# Patient Record
Sex: FEMALE | Race: WHITE | NOT HISPANIC OR LATINO | Employment: PART TIME | URBAN - METROPOLITAN AREA
[De-identification: names, ages, dates, MRNs, and addresses within clinical notes are randomized per-mention and may not be internally consistent; named-entity substitution may affect disease eponyms.]

---

## 2019-03-12 ENCOUNTER — OFFICE VISIT (OUTPATIENT)
Dept: FAMILY MEDICINE | Facility: CLINIC | Age: 34
End: 2019-03-12
Payer: MEDICAID

## 2019-03-12 VITALS
HEIGHT: 63 IN | HEART RATE: 76 BPM | SYSTOLIC BLOOD PRESSURE: 120 MMHG | RESPIRATION RATE: 16 BRPM | BODY MASS INDEX: 39.48 KG/M2 | DIASTOLIC BLOOD PRESSURE: 72 MMHG | TEMPERATURE: 98 F | OXYGEN SATURATION: 98 % | WEIGHT: 222.81 LBS

## 2019-03-12 DIAGNOSIS — R20.2 NUMBNESS AND TINGLING OF RIGHT ARM: ICD-10-CM

## 2019-03-12 DIAGNOSIS — R20.0 NUMBNESS AND TINGLING OF RIGHT ARM: ICD-10-CM

## 2019-03-12 DIAGNOSIS — L53.9 ERYTHEMA: ICD-10-CM

## 2019-03-12 DIAGNOSIS — M25.511 ACUTE PAIN OF RIGHT SHOULDER: ICD-10-CM

## 2019-03-12 DIAGNOSIS — S49.91XA RIGHT SHOULDER INJURY, INITIAL ENCOUNTER: ICD-10-CM

## 2019-03-12 DIAGNOSIS — M25.311 INSTABILITY OF SHOULDER JOINT, RIGHT: Primary | ICD-10-CM

## 2019-03-12 PROCEDURE — 99203 PR OFFICE/OUTPT VISIT, NEW, LEVL III, 30-44 MIN: ICD-10-PCS | Mod: S$GLB,,, | Performed by: NURSE PRACTITIONER

## 2019-03-12 PROCEDURE — 99203 OFFICE O/P NEW LOW 30 MIN: CPT | Mod: S$GLB,,, | Performed by: NURSE PRACTITIONER

## 2019-03-12 RX ORDER — DICLOFENAC SODIUM 50 MG/1
1 TABLET, DELAYED RELEASE ORAL 3 TIMES DAILY PRN
COMMUNITY
Start: 2019-03-09 | End: 2019-06-04

## 2019-03-12 RX ORDER — TRAMADOL HYDROCHLORIDE 50 MG/1
50 TABLET ORAL EVERY 8 HOURS PRN
Qty: 28 TABLET | Refills: 0 | Status: SHIPPED | OUTPATIENT
Start: 2019-03-12 | End: 2019-05-17 | Stop reason: SDUPTHER
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
## 2019-05-17 ENCOUNTER — OFFICE VISIT (OUTPATIENT)
Dept: FAMILY MEDICINE | Facility: CLINIC | Age: 34
End: 2019-05-17
Payer: MEDICAID

## 2019-05-17 VITALS
OXYGEN SATURATION: 98 % | HEART RATE: 66 BPM | RESPIRATION RATE: 16 BRPM | SYSTOLIC BLOOD PRESSURE: 128 MMHG | WEIGHT: 228 LBS | DIASTOLIC BLOOD PRESSURE: 70 MMHG | BODY MASS INDEX: 40.4 KG/M2 | HEIGHT: 63 IN

## 2019-05-17 DIAGNOSIS — R20.0 NUMBNESS AND TINGLING OF RIGHT ARM: ICD-10-CM

## 2019-05-17 DIAGNOSIS — M25.511 ACUTE PAIN OF RIGHT SHOULDER: ICD-10-CM

## 2019-05-17 DIAGNOSIS — M25.311 INSTABILITY OF SHOULDER JOINT, RIGHT: Primary | ICD-10-CM

## 2019-05-17 DIAGNOSIS — S49.91XD RIGHT SHOULDER INJURY, SUBSEQUENT ENCOUNTER: ICD-10-CM

## 2019-05-17 DIAGNOSIS — R20.2 NUMBNESS AND TINGLING OF RIGHT ARM: ICD-10-CM

## 2019-05-17 PROCEDURE — 99214 OFFICE O/P EST MOD 30 MIN: CPT | Mod: S$GLB,,, | Performed by: NURSE PRACTITIONER

## 2019-05-17 PROCEDURE — 99214 PR OFFICE/OUTPT VISIT, EST, LEVL IV, 30-39 MIN: ICD-10-PCS | Mod: S$GLB,,, | Performed by: NURSE PRACTITIONER

## 2019-05-17 RX ORDER — NAPROXEN SODIUM 550 MG/1
550 TABLET ORAL 2 TIMES DAILY WITH MEALS
Qty: 60 TABLET | Refills: 0 | Status: SHIPPED | OUTPATIENT
Start: 2019-05-17 | End: 2019-06-18 | Stop reason: SDUPTHER

## 2019-05-17 RX ORDER — TRAMADOL HYDROCHLORIDE 50 MG/1
50 TABLET ORAL EVERY 6 HOURS PRN
Qty: 28 TABLET | Refills: 1 | Status: SHIPPED | OUTPATIENT
Start: 2019-05-17 | End: 2019-06-04 | Stop reason: SDUPTHER
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
## 2019-05-20 ENCOUNTER — PATIENT MESSAGE (OUTPATIENT)
Dept: FAMILY MEDICINE | Facility: CLINIC | Age: 34
End: 2019-05-20

## 2019-05-23 DIAGNOSIS — M25.511 CHRONIC RIGHT SHOULDER PAIN: Primary | ICD-10-CM

## 2019-05-23 DIAGNOSIS — S46.001D ROTATOR CUFF INJURY, RIGHT, SUBSEQUENT ENCOUNTER: ICD-10-CM

## 2019-05-23 DIAGNOSIS — G89.29 CHRONIC RIGHT SHOULDER PAIN: Primary | ICD-10-CM

## 2019-06-04 ENCOUNTER — OFFICE VISIT (OUTPATIENT)
Dept: FAMILY MEDICINE | Facility: CLINIC | Age: 34
End: 2019-06-04
Payer: MEDICAID

## 2019-06-04 VITALS
BODY MASS INDEX: 40.57 KG/M2 | TEMPERATURE: 97 F | HEART RATE: 80 BPM | OXYGEN SATURATION: 98 % | RESPIRATION RATE: 16 BRPM | WEIGHT: 229 LBS | SYSTOLIC BLOOD PRESSURE: 110 MMHG | HEIGHT: 63 IN | DIASTOLIC BLOOD PRESSURE: 70 MMHG

## 2019-06-04 DIAGNOSIS — R21 RASH/SKIN ERUPTION: Primary | ICD-10-CM

## 2019-06-04 DIAGNOSIS — M75.111 INCOMPLETE TEAR OF RIGHT ROTATOR CUFF: ICD-10-CM

## 2019-06-04 DIAGNOSIS — R20.0 NUMBNESS AND TINGLING OF RIGHT ARM: ICD-10-CM

## 2019-06-04 DIAGNOSIS — R20.2 NUMBNESS AND TINGLING OF RIGHT ARM: ICD-10-CM

## 2019-06-04 DIAGNOSIS — M25.511 ACUTE PAIN OF RIGHT SHOULDER: ICD-10-CM

## 2019-06-04 DIAGNOSIS — M25.311 INSTABILITY OF SHOULDER JOINT, RIGHT: ICD-10-CM

## 2019-06-04 PROCEDURE — 99213 OFFICE O/P EST LOW 20 MIN: CPT | Mod: S$GLB,,, | Performed by: NURSE PRACTITIONER

## 2019-06-04 PROCEDURE — 99213 PR OFFICE/OUTPT VISIT, EST, LEVL III, 20-29 MIN: ICD-10-PCS | Mod: S$GLB,,, | Performed by: NURSE PRACTITIONER

## 2019-06-04 RX ORDER — TRAMADOL HYDROCHLORIDE 50 MG/1
100 TABLET ORAL EVERY 6 HOURS PRN
Qty: 56 TABLET | Refills: 0 | Status: SHIPPED | OUTPATIENT
Start: 2019-06-04 | End: 2019-09-11 | Stop reason: SDUPTHER

## 2019-06-04 RX ORDER — BETAMETHASONE VALERATE 1.2 MG/G
OINTMENT TOPICAL 2 TIMES DAILY
Qty: 15 G | Refills: 2 | Status: SHIPPED | OUTPATIENT
Start: 2019-06-04
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
## 2019-06-18 DIAGNOSIS — M25.511 ACUTE PAIN OF RIGHT SHOULDER: ICD-10-CM

## 2019-06-18 RX ORDER — NAPROXEN SODIUM 550 MG/1
TABLET ORAL
Qty: 60 TABLET | Refills: 0 | Status: SHIPPED | OUTPATIENT
Start: 2019-06-18 | End: 2019-08-14 | Stop reason: SDUPTHER

## 2019-08-14 DIAGNOSIS — M25.511 ACUTE PAIN OF RIGHT SHOULDER: ICD-10-CM

## 2019-08-15 RX ORDER — NAPROXEN SODIUM 550 MG/1
TABLET ORAL
Qty: 60 TABLET | Refills: 0 | Status: SHIPPED | OUTPATIENT
Start: 2019-08-15 | End: 2019-09-11 | Stop reason: SDUPTHER

## 2019-09-11 ENCOUNTER — OFFICE VISIT (OUTPATIENT)
Dept: FAMILY MEDICINE | Facility: CLINIC | Age: 34
End: 2019-09-11
Payer: MEDICAID

## 2019-09-11 VITALS
HEART RATE: 62 BPM | OXYGEN SATURATION: 99 % | TEMPERATURE: 97 F | SYSTOLIC BLOOD PRESSURE: 120 MMHG | RESPIRATION RATE: 16 BRPM | BODY MASS INDEX: 40.43 KG/M2 | WEIGHT: 228.19 LBS | HEIGHT: 63 IN | DIASTOLIC BLOOD PRESSURE: 80 MMHG

## 2019-09-11 DIAGNOSIS — M25.511 ACUTE PAIN OF RIGHT SHOULDER: ICD-10-CM

## 2019-09-11 DIAGNOSIS — R20.0 NUMBNESS AND TINGLING OF RIGHT ARM: ICD-10-CM

## 2019-09-11 DIAGNOSIS — M54.2 NECK PAIN: ICD-10-CM

## 2019-09-11 DIAGNOSIS — M79.601 PAIN IN RIGHT ARM: Primary | ICD-10-CM

## 2019-09-11 DIAGNOSIS — M75.111 INCOMPLETE TEAR OF RIGHT ROTATOR CUFF: ICD-10-CM

## 2019-09-11 DIAGNOSIS — R20.2 NUMBNESS AND TINGLING OF RIGHT ARM: ICD-10-CM

## 2019-09-11 PROCEDURE — 99214 OFFICE O/P EST MOD 30 MIN: CPT | Mod: S$GLB,,, | Performed by: NURSE PRACTITIONER

## 2019-09-11 PROCEDURE — 99214 PR OFFICE/OUTPT VISIT, EST, LEVL IV, 30-39 MIN: ICD-10-PCS | Mod: S$GLB,,, | Performed by: NURSE PRACTITIONER

## 2019-09-11 RX ORDER — TRAMADOL HYDROCHLORIDE 50 MG/1
50 TABLET ORAL EVERY 6 HOURS PRN
Qty: 28 TABLET | Refills: 0 | Status: SHIPPED | OUTPATIENT
Start: 2019-09-11

## 2019-09-11 RX ORDER — NAPROXEN SODIUM 550 MG/1
550 TABLET ORAL 2 TIMES DAILY WITH MEALS
Qty: 60 TABLET | Refills: 0 | Status: SHIPPED | OUTPATIENT
Start: 2019-09-11

## 2019-09-11 NOTE — PROGRESS NOTES
Subjective:       Patient ID: Blanca Teixeira is a 34 y.o. female.    Chief Complaint: Shoulder Pain (right shoulder. when to [facility] in [facility] and they said she had a pinched nerve. the rotater cup tear was .)    HPI     Saw orthopedic Dr ROA at [facility] for his partial tear supraD/infraspinatous. Attempted intra-articular injections in posterior shoulder and underwent PT--both made pain worse.     She went ortho at [facility] in [place] [phone] fax. Orthopedic there told her he felt the pain is coming from in the neck.     Now pain is primarily in arm and not shoulder. ROM still limited in her right shoulder--but arm is numb/tingling starting  in elbow all the way to finger. Intermittent tingling pain in all fingers      Review of Systems   Constitutional: Negative for activity change, appetite change, chills and fever.   HENT: Negative for congestion, hearing loss, nosebleeds, postnasal drip, sinus pressure, sore throat and trouble swallowing.    Respiratory: Negative for cough, chest tightness, shortness of breath and wheezing.    Cardiovascular: Negative for chest pain and palpitations.   Gastrointestinal: Negative for abdominal distention, abdominal pain, constipation, diarrhea, nausea and vomiting.   Genitourinary: Negative for difficulty urinating, flank pain, frequency, hematuria and urgency.   Musculoskeletal: Positive for arthralgias, myalgias and neck pain. Negative for back pain, joint swelling and neck stiffness.   Skin: Negative for color change and pallor.   Neurological: Positive for numbness. Negative for dizziness, seizures, syncope, weakness, light-headedness and headaches.   Hematological: Negative for adenopathy. Does not bruise/bleed easily.   Psychiatric/Behavioral: Negative for agitation, behavioral problems, confusion and sleep disturbance. The patient is not nervous/anxious.        Objective:      Physical Exam   Constitutional: She is oriented  to person, place, and time. She appears well-developed and well-nourished.   HENT:   Head: Normocephalic and atraumatic.   Mouth/Throat: Oropharynx is clear and moist.   Eyes: Pupils are equal, round, and reactive to light. Conjunctivae and EOM are normal. No scleral icterus.   Neck: Trachea normal and normal range of motion. Neck supple. Carotid bruit is not present. No thyroid mass present.   Cardiovascular: Normal rate, regular rhythm and normal heart sounds.   Pulmonary/Chest: Effort normal and breath sounds normal.   Abdominal: Soft. Bowel sounds are normal.   Musculoskeletal: She exhibits edema and tenderness.        Right shoulder: She exhibits decreased range of motion, tenderness, bony tenderness and swelling.   Cannot raise right arm above shoulder level; clicking/grating sensation w/ pain ROM attempt    Neurological: She is alert and oriented to person, place, and time. A sensory deficit (right digits/hand) is present.   Skin: Skin is warm and dry.   Psychiatric: She has a normal mood and affect. Her behavior is normal. Judgment normal.       Assessment:       1. Pain in right arm    2. Neck pain    3. Numbness and tingling of right arm        Plan:       PROBLEM LIST     Blanca was seen today for shoulder pain.    Diagnoses and all orders for this visit:    Pain in right arm  -     MRI Cervical Spine Without Contrast; Future  -     X-Ray Cervical Spine 2 or 3 Views; Future  -     MRI Cervical Spine Without Contrast  -     X-Ray Cervical Spine 2 or 3 Views    Neck pain  -     MRI Cervical Spine Without Contrast; Future  -     X-Ray Cervical Spine 2 or 3 Views; Future  -     MRI Cervical Spine Without Contrast  -     X-Ray Cervical Spine 2 or 3 Views    Numbness and tingling of right arm  -     MRI Cervical Spine Without Contrast; Future  -     X-Ray Cervical Spine 2 or 3 Views; Future  -     MRI Cervical Spine Without Contrast  -     X-Ray Cervical Spine 2 or 3 Views    Will get records from her recent  ortho visit in [place]. Pain now in in right arm, numbness, can not lift right forearm. While she does have rotator cuff injury, it was in his opinion the the majority of her problem is cervical radiculopathy.     In the meantime, will get xray and mri of neck, Renewing her naproxen and tramadol/ RTC 2-3 days after MRI

## 2019-12-05 ENCOUNTER — OFFICE VISIT (OUTPATIENT)
Dept: FAMILY MEDICINE | Facility: CLINIC | Age: 34
End: 2019-12-05
Payer: MEDICAID

## 2019-12-05 VITALS
RESPIRATION RATE: 16 BRPM | SYSTOLIC BLOOD PRESSURE: 102 MMHG | WEIGHT: 236.38 LBS | DIASTOLIC BLOOD PRESSURE: 48 MMHG | HEIGHT: 63 IN | TEMPERATURE: 97 F | HEART RATE: 93 BPM | BODY MASS INDEX: 41.88 KG/M2 | OXYGEN SATURATION: 97 %

## 2019-12-05 DIAGNOSIS — M75.111 INCOMPLETE TEAR OF RIGHT ROTATOR CUFF, UNSPECIFIED WHETHER TRAUMATIC: ICD-10-CM

## 2019-12-05 DIAGNOSIS — M47.812 CERVICAL SPONDYLOSIS: ICD-10-CM

## 2019-12-05 DIAGNOSIS — R20.0 NUMBNESS AND TINGLING OF RIGHT ARM: ICD-10-CM

## 2019-12-05 DIAGNOSIS — M25.311 INSTABILITY OF SHOULDER JOINT, RIGHT: ICD-10-CM

## 2019-12-05 DIAGNOSIS — M79.601 PAIN IN RIGHT ARM: ICD-10-CM

## 2019-12-05 DIAGNOSIS — M25.511 ACUTE PAIN OF RIGHT SHOULDER: ICD-10-CM

## 2019-12-05 DIAGNOSIS — M54.2 NECK PAIN: Primary | ICD-10-CM

## 2019-12-05 DIAGNOSIS — R20.2 NUMBNESS AND TINGLING OF RIGHT ARM: ICD-10-CM

## 2019-12-05 PROCEDURE — 99214 OFFICE O/P EST MOD 30 MIN: CPT | Mod: S$GLB,,, | Performed by: NURSE PRACTITIONER

## 2019-12-05 PROCEDURE — 99214 PR OFFICE/OUTPT VISIT, EST, LEVL IV, 30-39 MIN: ICD-10-PCS | Mod: S$GLB,,, | Performed by: NURSE PRACTITIONER

## 2019-12-05 RX ORDER — TRAMADOL HYDROCHLORIDE 50 MG/1
50 TABLET ORAL EVERY 6 HOURS PRN
Qty: 28 TABLET | Refills: 0 | Status: SHIPPED | OUTPATIENT
Start: 2019-12-05

## 2019-12-05 NOTE — PROGRESS NOTES
Subjective:       Patient ID: Blanca Teixeira is a 34 y.o. female.    Chief Complaint: neck, shoulder pain    HPI     Saw orthopedic Dr Mace at [facility] for his partial tear supra-/infraspinatous. Attempted intra-articular injections in posterior shoulder and underwent PT--both made pain worse.      She went ortho at [facility] in [place] [phone], [phone] fax. Orthopedic there told her he felt the pain is coming from in the neck.     Cervical MRI 9/19/19 primarily showed osteoarthritic changes at Levels C5-C6 without significant nerve impingement.      Pain persists in right shoulder and right arm. ROM still limited in her right shoulder-- arm is numb/tingling starting  in elbow all the way to finger. Intermittent tingling pain in all fingers. Has difficult time raising arm above shoulder level.    Review of Systems   Constitutional: Negative for activity change, appetite change, chills and fever.   HENT: Negative for congestion, hearing loss, nosebleeds, postnasal drip, sinus pressure, sore throat and trouble swallowing.    Respiratory: Negative for cough, chest tightness, shortness of breath and wheezing.    Cardiovascular: Negative for chest pain and palpitations.   Gastrointestinal: Negative for abdominal distention, abdominal pain, constipation, diarrhea, nausea and vomiting.   Genitourinary: Negative for difficulty urinating, flank pain, frequency, hematuria and urgency.   Musculoskeletal: Positive for arthralgias, myalgias and neck pain. Negative for back pain, joint swelling and neck stiffness.   Skin: Negative for color change and pallor.   Neurological: Positive for numbness. Negative for dizziness, seizures, syncope, weakness, light-headedness and headaches.   Hematological: Negative for adenopathy. Does not bruise/bleed easily.   Psychiatric/Behavioral: Negative for agitation, behavioral problems, confusion and sleep disturbance. The patient is not nervous/anxious.         Objective:      Physical Exam   Constitutional: She is oriented to person, place, and time. She appears well-developed and well-nourished.   HENT:   Head: Normocephalic and atraumatic.   Mouth/Throat: Oropharynx is clear and moist.   Eyes: Pupils are equal, round, and reactive to light. Conjunctivae and EOM are normal. No scleral icterus.   Neck: Trachea normal and normal range of motion. Neck supple. Carotid bruit is not present. No thyroid mass present.   Cardiovascular: Normal rate, regular rhythm and normal heart sounds.   Pulmonary/Chest: Effort normal and breath sounds normal.   Abdominal: Soft. Bowel sounds are normal.   Musculoskeletal: She exhibits edema and tenderness.        Right shoulder: She exhibits decreased range of motion, tenderness, bony tenderness and swelling.   Cannot raise right arm above shoulder level; clicking/grating sensation w/ pain ROM attempt    Neurological: She is alert and oriented to person, place, and time. A sensory deficit (right digits/hand) is present.   Skin: Skin is warm and dry.   Psychiatric: She has a normal mood and affect. Her behavior is normal. Judgment normal.       Assessment:       1. Neck pain    2. Cervical spondylosis    3. Pain in right arm    4. Numbness and tingling of right arm    5. Acute pain of right shoulder    6. Incomplete tear of right rotator cuff, unspecified whether traumatic    7. Instability of shoulder joint, right        Plan:       PROBLEM LIST     Blanca was seen today for medication refill.    Diagnoses and all orders for this visit:    Neck pain    Cervical spondylosis    Pain in right arm    Numbness and tingling of right arm    Acute pain of right shoulder  -     traMADol (ULTRAM) 50 mg tablet; Take 1 tablet (50 mg total) by mouth every 6 (six) hours as needed.    Incomplete tear of right rotator cuff, unspecified whether traumatic  -     Ambulatory referral/consult to Orthopedics; Future    Instability of shoulder joint, right  -      Ambulatory referral/consult to Orthopedics; Future    Failed conservative treatment w/ medications and PT. Will ask ortho to re-eval right shoulder/rotator cuff.

## 2020-07-08 ENCOUNTER — TELEPHONE (OUTPATIENT)
Dept: FAMILY MEDICINE | Facility: CLINIC | Age: 35
End: 2020-07-08

## 2020-07-23 ENCOUNTER — OFFICE VISIT (OUTPATIENT)
Dept: ORTHOPEDICS | Facility: CLINIC | Age: 35
End: 2020-07-23
Payer: MEDICAID

## 2020-07-23 VITALS — WEIGHT: 233.13 LBS | TEMPERATURE: 98 F | HEIGHT: 64 IN | BODY MASS INDEX: 39.8 KG/M2

## 2020-07-23 DIAGNOSIS — M75.111 NONTRAUMATIC INCOMPLETE TEAR OF RIGHT ROTATOR CUFF: Primary | ICD-10-CM

## 2020-07-23 PROCEDURE — 20610 LARGE JOINT ASPIRATION/INJECTION: R SUBACROMIAL BURSA: ICD-10-PCS | Mod: RT,S$GLB,, | Performed by: ORTHOPAEDIC SURGERY

## 2020-07-23 PROCEDURE — 99203 OFFICE O/P NEW LOW 30 MIN: CPT | Mod: 25,S$GLB,, | Performed by: ORTHOPAEDIC SURGERY

## 2020-07-23 PROCEDURE — 99203 PR OFFICE/OUTPT VISIT, NEW, LEVL III, 30-44 MIN: ICD-10-PCS | Mod: 25,S$GLB,, | Performed by: ORTHOPAEDIC SURGERY

## 2020-07-23 PROCEDURE — 20610 DRAIN/INJ JOINT/BURSA W/O US: CPT | Mod: RT,S$GLB,, | Performed by: ORTHOPAEDIC SURGERY

## 2020-07-23 NOTE — PROCEDURES
Large Joint Aspiration/Injection: R subacromial bursa    Date/Time: 7/23/2020 8:30 AM  Performed by: Jadiel Kelley MD  Authorized by: Jadiel Kelley MD     Consent Done?:  Yes (Verbal)  Indications:  Pain  Timeout: prior to procedure the correct patient, procedure, and site was verified    Prep: patient was prepped and draped in usual sterile fashion      Local anesthesia used?: Yes    Local anesthetic:  Lidocaine 2% without epinephrine  Anesthetic total (ml):  2      Details:  Needle Size:  22 G  Approach:  Posterior  Location:  Shoulder  Site:  R subacromial bursa  Medications:  10 mg triamcinolone acetonide 10 mg/mL  Patient tolerance:  Patient tolerated the procedure well with no immediate complications

## 2020-07-23 NOTE — PROGRESS NOTES
Subjective:      Patient ID: Blanca Teixeira is a 35 y.o. female.    Chief Complaint: Pain of the Right Shoulder    HPI 35-year-old lady with a 1 year history of right shoulder pain and popping.  She has had 1 prior injection.  Review of Systems   Constitution: Negative for fever and weight loss.   Cardiovascular: Negative for chest pain and leg swelling.   Musculoskeletal: Positive for joint pain, muscle weakness and stiffness. Negative for arthritis and joint swelling.   Gastrointestinal: Negative for change in bowel habit.   Genitourinary: Negative for bladder incontinence and hematuria.   Neurological: Negative for focal weakness, numbness, paresthesias and sensory change.         Objective:                    Right Shoulder Exam     Inspection/Observation   Swelling: absent  Atrophy: absent    Tenderness   The patient is tender to palpation of the greater tuberosity and supraspinatus.    Crepitus   The patient has crepitus of the supraspinatus.    Range of Motion   Active abduction: 100   Passive abduction: 110   Extension: 50   Forward Flexion: 100 Adduction: 60   External Rotation 0 degrees: normal   Internal rotation 0 degrees: normal     Tests & Signs   Drop arm: negative  Mendoza test: positive  Rotator Cuff Painful Arc/Range: severe    Other   Sensation: normal    Muscle Strength   Right Upper Extremity   Shoulder Abduction: 4/5   Shoulder Internal Rotation: 5/5   Shoulder External Rotation: 5/5   Supraspinatus: 4/5/5               Assessment:       Encounter Diagnosis   Name Primary?    Nontraumatic incomplete tear of right rotator cuff Yes          Plan:       Blanca was seen today for pain.    Diagnoses and all orders for this visit:    Nontraumatic incomplete tear of right rotator cuff      Impression is partial thickness tear of the right rotator cuff.  The shoulder is injected today.  Return p.r.n. she is placed on meloxicam as well

## 2021-05-12 ENCOUNTER — PATIENT MESSAGE (OUTPATIENT)
Dept: RESEARCH | Facility: HOSPITAL | Age: 36
End: 2021-05-12